# Patient Record
Sex: MALE | Race: WHITE | NOT HISPANIC OR LATINO | Employment: FULL TIME | URBAN - METROPOLITAN AREA
[De-identification: names, ages, dates, MRNs, and addresses within clinical notes are randomized per-mention and may not be internally consistent; named-entity substitution may affect disease eponyms.]

---

## 2023-10-13 ENCOUNTER — OFFICE VISIT (OUTPATIENT)
Dept: PRIMARY CARE | Facility: CLINIC | Age: 28
End: 2023-10-13
Payer: COMMERCIAL

## 2023-10-13 VITALS
HEART RATE: 80 BPM | DIASTOLIC BLOOD PRESSURE: 87 MMHG | SYSTOLIC BLOOD PRESSURE: 130 MMHG | WEIGHT: 197 LBS | OXYGEN SATURATION: 96 % | HEIGHT: 65 IN | BODY MASS INDEX: 32.82 KG/M2

## 2023-10-13 DIAGNOSIS — Z00.00 WELLNESS EXAMINATION: Primary | ICD-10-CM

## 2023-10-13 DIAGNOSIS — Z23 NEED FOR VACCINATION: ICD-10-CM

## 2023-10-13 PROCEDURE — G0442 ANNUAL ALCOHOL SCREEN 15 MIN: HCPCS | Performed by: EMERGENCY MEDICINE

## 2023-10-13 PROCEDURE — 99395 PREV VISIT EST AGE 18-39: CPT | Performed by: EMERGENCY MEDICINE

## 2023-10-13 PROCEDURE — 1036F TOBACCO NON-USER: CPT | Performed by: EMERGENCY MEDICINE

## 2023-10-13 PROCEDURE — G0444 DEPRESSION SCREEN ANNUAL: HCPCS | Performed by: EMERGENCY MEDICINE

## 2023-10-13 NOTE — PROGRESS NOTES
Diagnoses/Problems     · Encounter for preventive health examination (V70.0) (Z00.00)     · STD exposure (V01.6) (Z20.2)     Provider Impressions     27-year-old man here for a physical     No other acute issue at this time     Preventive care-   Believes he has an up to date tetanus shot provided by pediatrician before he turned 19   At the age of 26 he does not yet qualify for any additional preventative care      I spent 15 minutes screening for alcohol use     PH Qâ€“9 depression screening was completed by authorized employee of the practice and spent 15 minutes explaining the questionnaire and discussing the answers with the patient.     Lab work     Follow-up as needed           Chief Complaint     Physical      History of Present Illness27-year-old here for physical        He has no acute issues at this time.     Past health history  Does not have any health history and does not take any medications     Family history-   Father side has heart disease  Grandfather  from lung cancer   Grandmother  from breast cancer     Social history  Vapes daily and drinks beer about 2 times per week  Denies drugs      Review of Systems  All eleven systems were reviewed with the patient and were negative for any symptoms other than what is documented in the history of present illness.        Active Problems     · STD exposure (V01.6) (Z20.2)     Social History     · Alcohol use (V49.89) (Z78.9)   · Tobacco use (305.1) (Z72.0)     Allergies     · No Known Allergies   Recorded By: Chloe Reyes; 10/13/2021 3:03:18 PM          Physical Exam  Vital signs as per nursing/MA documentation  General appearance: Alert and in no acute distress  HEENT: Normal Inspection  Neck - Normal Inspection  Respiratory : No respiratory distress. Lungs are clear   Cardiovascular: heart rate normal. No gallop  Back - normal inspection  Skin inspection:Warm  Musculoskeletal : No deformities  Neuro : Limited exam. Baseline  Psychiatric :  Cooperative                         Last signed by: Cecil Verduzco MD at 10/14/2022  3:57 PM   Electronically signed by Cecil Verduzco MD at 10/14/2022  3:57 PM

## 2023-10-19 LAB
NON-UH HIE A/G RATIO: 1.7
NON-UH HIE ALB: 4.5 G/DL (ref 3.4–5)
NON-UH HIE ALK PHOS: 71 UNIT/L (ref 45–117)
NON-UH HIE BASO COUNT: 0.02 X1000 (ref 0–0.2)
NON-UH HIE BASOS %: 0.2 %
NON-UH HIE BILIRUBIN, TOTAL: 0.7 MG/DL (ref 0.3–1.2)
NON-UH HIE BUN/CREAT RATIO: 15
NON-UH HIE BUN: 18 MG/DL (ref 9–23)
NON-UH HIE CALCIUM: 9.8 MG/DL (ref 8.7–10.4)
NON-UH HIE CALCULATED LDL CHOLESTEROL: 132 MG/DL (ref 60–130)
NON-UH HIE CALCULATED OSMOLALITY: 283 MOSM/KG (ref 275–295)
NON-UH HIE CHLORIDE: 103 MMOL/L (ref 98–107)
NON-UH HIE CHOLESTEROL: 219 MG/DL (ref 100–200)
NON-UH HIE CO2, VENOUS: 30 MMOL/L (ref 20–31)
NON-UH HIE CREATININE: 1.2 MG/DL (ref 0.6–1.1)
NON-UH HIE DIFF?: NO
NON-UH HIE EOS COUNT: 0.08 X1000 (ref 0–0.5)
NON-UH HIE EOSIN %: 1 %
NON-UH HIE GFR AA: >60
NON-UH HIE GLOBULIN: 2.6 G/DL
NON-UH HIE GLOMERULAR FILTRATION RATE: >60 ML/MIN/1.73M?
NON-UH HIE GLUCOSE: 87 MG/DL (ref 74–106)
NON-UH HIE GOT: 34 UNIT/L (ref 15–37)
NON-UH HIE GPT: 70 UNIT/L (ref 10–49)
NON-UH HIE HCT: 47.8 % (ref 41–52)
NON-UH HIE HDL CHOLESTEROL: 42 MG/DL (ref 40–60)
NON-UH HIE HGB A1C: 5.2 %
NON-UH HIE HGB: 16.1 G/DL (ref 13.5–17.5)
NON-UH HIE INSTR WBC: 8.9
NON-UH HIE K: 4.3 MMOL/L (ref 3.5–5.1)
NON-UH HIE LYMPH %: 33.7 %
NON-UH HIE LYMPH COUNT: 2.99 X1000 (ref 1.2–4.8)
NON-UH HIE MCH: 31.1 PG (ref 27–34)
NON-UH HIE MCHC: 33.6 G/DL (ref 32–37)
NON-UH HIE MCV: 92.6 FL (ref 80–100)
NON-UH HIE MONO %: 9.4 %
NON-UH HIE MONO COUNT: 0.84 X1000 (ref 0.1–1)
NON-UH HIE MPV: 8.7 FL (ref 7.4–10.4)
NON-UH HIE NA: 141 MMOL/L (ref 135–145)
NON-UH HIE NEUTROPHIL %: 55.6 %
NON-UH HIE NEUTROPHIL COUNT (ANC): 4.93 X1000 (ref 1.4–8.8)
NON-UH HIE NUCLEATED RBC: 0 /100WBC
NON-UH HIE PLATELET: 325 X10 (ref 150–450)
NON-UH HIE RBC: 5.16 X10 (ref 4.7–6.1)
NON-UH HIE RDW: 12.9 % (ref 11.5–14.5)
NON-UH HIE TOTAL CHOL/HDL CHOL RATIO: 5.2
NON-UH HIE TOTAL PROTEIN: 7.1 G/DL (ref 5.7–8.2)
NON-UH HIE TRIGLYCERIDES: 226 MG/DL (ref 30–150)
NON-UH HIE TSH: 2.64 UIU/ML (ref 0.55–4.78)
NON-UH HIE WBC: 8.9 X10 (ref 4.5–11)

## 2023-10-23 ENCOUNTER — TELEPHONE (OUTPATIENT)
Dept: PRIMARY CARE | Facility: CLINIC | Age: 28
End: 2023-10-23
Payer: COMMERCIAL

## 2023-10-23 NOTE — TELEPHONE ENCOUNTER
PT was in and provided paper which was a form stating he had a physical.  Only needed filled out once BW results were in and faxed.     Were you able to fill this form out and fax?    He also has a copy of form if needed?

## 2023-10-26 NOTE — TELEPHONE ENCOUNTER
----- Message from Jam Dikcinson sent at 10/26/2023 11:39 AM EDT -----  Regarding: Jam Dickinson - Talk to your Doc Physician Form  Contact: [phone]  [place]  Please find physician form attached that i am needing completed and either sent by the following options:  Sent back to me via email or this messaging software  Faxed: [phone]  Emailed: [email or web address]    Needing this completed by 10/31/23.  COuld you please follow up with me when this is completed/sent out?    Again, thank you! have a nice day.

## 2023-10-27 NOTE — TELEPHONE ENCOUNTER
----- Message from Jam Dickinson sent at 10/27/2023  3:25 PM EDT -----  Regarding: BW Form  Contact: [phone]  See attached  Thanks!!

## 2024-08-09 ENCOUNTER — HOSPITAL ENCOUNTER (EMERGENCY)
Age: 29
Discharge: HOME | End: 2024-08-09
Payer: COMMERCIAL

## 2024-08-09 VITALS
RESPIRATION RATE: 16 BRPM | OXYGEN SATURATION: 97 % | TEMPERATURE: 98.2 F | SYSTOLIC BLOOD PRESSURE: 131 MMHG | HEART RATE: 82 BPM | DIASTOLIC BLOOD PRESSURE: 91 MMHG

## 2024-08-09 DIAGNOSIS — J06.9 ACUTE UPPER RESPIRATORY INFECTION: Primary | ICD-10-CM

## 2024-08-09 PROCEDURE — 99203 OFFICE O/P NEW LOW 30 MIN: CPT

## 2024-08-09 PROCEDURE — 99202 OFFICE O/P NEW SF 15 MIN: CPT | Performed by: NURSE PRACTITIONER

## 2024-08-09 RX ORDER — DEXTROMETHORPHAN POLISTIREX 30 MG/5ML
60 SUSPENSION ORAL 2 TIMES DAILY PRN
Refills: 0 | COMMUNITY
Start: 2024-08-09

## 2024-08-09 RX ORDER — PSEUDOEPHEDRINE HCL 30 MG
30 TABLET ORAL EVERY 4 HOURS PRN
Refills: 1 | COMMUNITY
Start: 2024-08-09

## 2024-08-09 ASSESSMENT — ENCOUNTER SYMPTOMS
SORE THROAT: 0
RHINORRHEA: 0
SINUS PRESSURE: 0
NAUSEA: 0
COUGH: 1
SHORTNESS OF BREATH: 0

## 2024-08-09 ASSESSMENT — PAIN - FUNCTIONAL ASSESSMENT: PAIN_FUNCTIONAL_ASSESSMENT: NONE - DENIES PAIN

## 2024-08-09 NOTE — ED PROVIDER NOTES
[facility] URGENT CARE  Urgent Care Encounter       CHIEF COMPLAINT       Chief Complaint   Patient presents with    Nasal Congestion    Cough       Nurses Notes reviewed and I agree except as noted in the HPI.  HISTORY OF PRESENT ILLNESS   Da Winter is a 28 y.o. male who presents with complaints of a persistent cough and congestion.  His symptoms started 10 days ago.  This is a new problem.  He has been taking Delsym and Sudafed which has helped.  He reports that his symptoms did improve over the past week and but on Tuesday returned.  He denies any fever, shortness of breath, runny nose, headache, or bodyaches.  He does report leaving work today due to being sick.      REVIEW OF SYSTEMS     Review of Systems   Constitutional:  Negative for fatigue and fever.   HENT:  Positive for congestion. Negative for rhinorrhea, sinus pressure and sore throat.    Respiratory:  Positive for cough. Negative for shortness of breath.    Cardiovascular:  Negative for chest pain.   Gastrointestinal:  Negative for nausea.   Musculoskeletal:  Negative for myalgias.   Neurological:  Negative for headaches.       PAST MEDICAL HISTORY   No past medical history on file.    SURGICALHISTORY     Patient  has no past surgical history on file.    CURRENT MEDICATIONS       Current Discharge Medication List        CONTINUE these medications which have NOT CHANGED    Details   dextromethorphan (DELSYM) 30 MG/5ML extended release liquid Take 10 mLs by mouth 2 times daily as needed for Cough  Refills: 0      pseudoephedrine (DECONGESTANT) 30 MG tablet Take 1 tablet by mouth every 4 hours as needed for Congestion  Refills: 1             ALLERGIES     Patient is has No Known Allergies.    Patients   There is no immunization history on file for this patient.    FAMILY HISTORY     Patient's family history is not on file.    SOCIAL HISTORY     Patient      PHYSICAL EXAM     ED TRIAGE VITALS  BP: (!) 131/91, Temp: 98.2 °F (36.8 °C),